# Patient Record
Sex: FEMALE | Race: OTHER | Employment: OTHER | URBAN - METROPOLITAN AREA
[De-identification: names, ages, dates, MRNs, and addresses within clinical notes are randomized per-mention and may not be internally consistent; named-entity substitution may affect disease eponyms.]

---

## 2017-06-12 NOTE — PATIENT DISCUSSION
retinal hemorrhage, non diabetic. disc at length with pt findings, scheduled consult with  as directed during today's visit.

## 2017-06-12 NOTE — PATIENT DISCUSSION
DISCUSSED REASON FOR PAIN/TIANNA COULD BE FROM C-PAP AND DRYING EYES OUT.  CAN GET SOME ARTIFICIAL LIQUID TEARS OR GEL TO USE QHS

## 2017-07-14 NOTE — PATIENT DISCUSSION
LETTER TO DR. Maya Means: Marika Cordoba TO BE OBTAINED AT DR. Billy New LOCATION OF CHOICE -- BP CHECK, CBC with MANUAL DIFF, FASTING BG, Hgb A1C, CAROTID DUPLEX STUDY.

## 2017-07-14 NOTE — PATIENT DISCUSSION
RETINA IS ATTACHED: MID-PERIPHERAL RETINAL HEMORRHAGES. DIFFERENTIAL DX INCLUDES DIABETIC RETINOPATHY VS. HYPERTENSIVE RETINOPATHY VS. OCULAR ISCHEMIC SYNDROME (CAROTID DISEASE). NO NEW HOLES OR TEARS SEEN ON DILATED EXAM TODAY.  RETINAL DETACHMENT SIGNS AND SYMPTOMS REVIEWED

## 2017-10-20 NOTE — PATIENT DISCUSSION
MID-PERIPHERAL RETINAL HEMORRHAGES OS: DM AND HTN SCREENING WERE NORMAL. AWAITING CAROTID DUPLEX REPORT TO RULE OUT CAROTID STENOSIS. CBC SHOWED LOW PLATELETS: RECOMMEND FURTHER WORK-UP FOR THROMBOCYTOPENIA. WILL REQUEST HEMATOLOGY CONSULT THROUGH DR Yvon Felix.

## 2018-01-26 NOTE — PATIENT DISCUSSION
EPIMACULAR MEMBRANE, OS: PATIENT NOT FUNCTIONALLY BOTHERED. SURGERY NOT RECOMMENDED AT THIS TIME. RETURN AS SCHEDULED FOR OCT / EVALUATION.

## 2018-01-26 NOTE — PATIENT DISCUSSION
STABLE RETINAL HEMORRHAGES:  RETURN TO DR Nafisa Norton. LIFELINE SCREENING SHOWED NO SIGNIFICANT FLOW ABNORMALITIES; HOWEVER I RECOMMEND SCHEDULING DEDICATED CAROTID DUPLEX WITH DR Nafisa Norton TO RULE OUT STENOSIS. RECOMMEND RETURNING TO DR Nafisa Norton FOR WORK-UP OF THROMBOCYTOPENIA FOUND ON CBC TEST.

## 2018-04-27 NOTE — PATIENT DISCUSSION
RETINA IS ATTACHED OS: PERIPHERAL RETINAL HEMORRHAGES STABLE; NO HOLES OR TEARS SEEN ON DILATED EXAM TODAY.  RETINAL DETACHMENT SIGNS AND SYMPTOMS REVIEWED

## 2018-04-27 NOTE — PATIENT DISCUSSION
CAROTID DOPPLERS SHOWED NO SIGNIFICANT STENOSIS. CBC SHOWED MILD THROMBOCYTOPENIA. RECOMMEND FOLLOW-UP WITH DR Claudia Baca TO ADDRESS RESULTS OF CBC.

## 2018-04-27 NOTE — PATIENT DISCUSSION
A Retinal hemorrhage is a disorder of the eye in which bleeding occurs into the retinal tissue in the back of the eye. A retinal hemorrhage can be caused by hypertension, retinal vein occlusion or diabetes mellitus.  retinal hemorrhages are diagnosed and managed by dilated examination in detail by ophthalmoscopy, fundus photography, or a dilated fundus exam.

## 2018-10-26 NOTE — PATIENT DISCUSSION
COUNSELING: [facility] Hospital Medicine  Progress Note    Patient Name: Genie Balderas  MRN: [id number]  Patient Class: IP- Inpatient   Admission Date: 9/24/2017  Length of Stay: 2 days  Attending Physician: Rebel Cardenas MD  Primary Care Provider: Ryan Sims MD        Subjective:     Principal Problem:Sepsis    HPI:  Genie Balderas is a 55 y.o. female patient who presented to the Emergency Department for complaint of Cough that started 2 days ago. The cough is non productive. Associated symptoms include Sharp Like CP to Left chest radiates to back, that stated today due to coughing, and, fatigue, fever, n/v/d. Symptoms are intermittent and moderate in severity. OTC meds and nebs not helping at home.  No mitigating or exacerbating factors reported.  Patient denies any SOB, leg swelling, palpitations, HA, lightheadedness, dizziness, numbness, weakness, and all other sxs at this time. No further complaints or concerns at this time.        Hospital Course:  Patient was placed on OBS for sepsis d/t bilat lower lobe pneumonia. She was started on IV abx, breathing treatments, and mucinex. Patient improving slowly - moved to Inpatient since will need extended IV abx.     6/26;  Some better;  Has faint posterior wheezing today;Continued cough; upper back pain on left, with the cough  On for repeat CXR today; also incentive spirometry added; also tylenol with codeine for coughtand pain complaint.    6/27:  Looks and feels better;  Still with back pain though less;  Had restful night overnight; working with incentive device once hourly while awake.    Interval History: reports some improvement with less cough; rested better overnight.    Review of Systems   Constitutional: Positive for fatigue.   HENT: Negative.    Eyes: Negative.    Respiratory: Positive for cough and shortness of breath.    Cardiovascular: Positive for chest pain.   Gastrointestinal: Negative.    Endocrine: Negative.    Genitourinary: Negative.     Musculoskeletal: Positive for back pain.   Neurological: Negative.    Hematological: Negative.    Psychiatric/Behavioral: Negative.      Objective:     Vital Signs (Most Recent):  Temp: 99.2 °F (37.3 °C) (09/27/17 0742)  Pulse: 95 (09/27/17 0845)  Resp: (!) 22 (09/27/17 0845)  BP: 132/67 (09/27/17 0742)  SpO2: 98 % (09/27/17 0845) Vital Signs (24h Range):  Temp:  [97.4 °F (36.3 °C)-100.3 °F (37.9 °C)] 99.2 °F (37.3 °C)  Pulse:  [] 95  Resp:  [16-22] 22  SpO2:  [94 %-98 %] 98 %  BP: (120-143)/(59-75) 132/67     Weight: 72.9 kg (160 lb 11.2 oz)  Body mass index is 27.58 kg/m².    Intake/Output Summary (Last 24 hours) at 09/27/17 1057  Last data filed at 09/27/17 0400   Gross per 24 hour   Intake              220 ml   Output             1700 ml   Net            -1480 ml      Physical Exam   Constitutional: She is oriented to person, place, and time. She appears well-developed and well-nourished.   HENT:   Head: Normocephalic and atraumatic.   Eyes: EOM are normal. Pupils are equal, round, and reactive to light.   Neck: Normal range of motion. Neck supple.   Cardiovascular: Normal rate and regular rhythm.    Pulmonary/Chest: Effort normal.   Diminished at bases;    Genitourinary:   Genitourinary Comments: deferred   Musculoskeletal: Normal range of motion.   Left flank pain to palpation.   Neurological: She is alert and oriented to person, place, and time.   Skin: Skin is warm and dry.       Significant Labs:   Blood Culture: No results for input(s): LABBLOO in the last 48 hours.  BMP:   Recent Labs  Lab 09/27/17  0415         K 3.1*      CO2 32*   BUN 3*   CREATININE 0.8   CALCIUM 8.7   MG 2.0  2.1     CBC:   Recent Labs  Lab 09/26/17  0540 09/27/17  0415   WBC 14.03* 11.08   HGB 9.5* 9.7*   HCT 29.6* 30.6*   * 433*     Respiratory Culture: No results for input(s): GSRESP, RESPIRATORYC in the last 48 hours.    Significant Imaging:   Imaging Results          X-Ray Chest 1 View (Final  result)  Result time 09/26/17 10:45:23   Procedure changed from X-Ray Chest PA And Lateral     Final result by JEFFREY Britt Sr., MD (09/26/17 10:45:23)                 Impression:      There has been interval development of patchy areas of increased density scattered throughout the lungs. This is consistent with the patient's history and characteristic of pneumonia.      Electronically signed by: JEFFREY BRITT MD  Date:     09/26/17  Time:    10:45              Narrative:    One-view chest x-ray    Clinical History: Pneumonia    Finding: Comparison was made to a prior examination performed on 9/24/2017. The size of the heart is normal. There has been interval development of patchy areas of increased density scattered throughout the lungs. There is no pneumothorax.  The costophrenic angles are sharp.                             CT Abdomen Pelvis  Without Contrast (Final result)  Result time 09/25/17 07:34:22    Final result by Deuce Sherman MD (09/25/17 07:34:22)                 Impression:       Bilateral parenchymal infiltrates are seen within the lower lobes.    Mild prominence of the right renal collecting system however no obstructing stone is seen. This can be seen in a setting of recently passed stone.     Left inguinal adenopathy measuring up to 1.4 cm.    Subcentimeter hypodensity is seen within the mid body of the pancreas which is indeterminate. Consider interval followup    All CT scans at this facility use dose modulation, iterative reconstruction and/or weight based dosing when appropriate to reduce radiation dose to as low as reasonably achievable.      Electronically signed by: DEUCE SHERMAN MD  Date:     09/25/17  Time:    07:34              Narrative:    Indication: Abdominal pain.    Routine noncontrast CT images of the abdomen and pelvis were obtained.    Findings:    Bilateral parenchymal infiltrates are seen within the lower lobes.  Heart size is normal.  No pericardial or pleural effusion.       The liver is normal in size and attenuation on this noncontrast exam.  Status post cholecystectomy The  stomach, spleen,  adrenal glands are normal.  Subcentimeter hypodensity is seen within the mid body of the pancreas which is indeterminate. Consider interval followup. Aorta demonstrates moderate atherosclerotic disease.      The kidneys are normal in size shape and position without radiopaque calculus or signs of hydronephrosis. Mild prominence of the right renal collecting system however no obstructing stone is seen. This can be seen in a setting of recently passed stone. The bladder is incompletely distended.     The visualized loops of small bowel are unremarkable.The appendix is visualized in the right lower quadrant.  There is no inflammation. Colon demonstrates diverticulosis without evidence of diverticulitis. Mild constipation    Left inguinal adenopathy..      Bones appear intact with moderate degenerative changes                             CTA Chest Non-Coronary (PE Study) (Final result)     Abnormal  Result time 09/25/17 07:39:18    Final result by Deuce Sherman MD (09/25/17 07:39:18)                 Impression:         Bilateral pulmonary infiltrates are seen some with a micronodular pattern which can be seen in infectious (typical or atypical) and or inflammatory process. Followup to resolution is recommended.    Mediastinal and bilateral hilar adenopathy which can be seen in the setting of infectious/reactive process versus neoplastic versus lymphoproliferative disorder.    No evidence of PE.    Fatty liver.    All CT scans at this facility use dose modulation, iterative reconstruction, and/or weight base dosing when appropriate to reduce radiation dose to as low as reasonably achievable.      Electronically signed by: DEUCE SHERMAN MD  Date:     09/25/17  Time:    07:39              Narrative:    Clinical data: Chest pain.    Axial CT images through the chest after administration of contrast  was performed according to PE study protocol using 100 cc Omni 350.    Findings:    The pulmonary arterial system is well opacified with no evidence of filling defect to suggest pulmonary embolus or thrombus.    Structures of the base of the neck are normal.    The heart and pericardium are unremarkable.  Trace pericardial effusion.  Mediastinal structures including great vessels, trachea, esophagus are intact.    Mediastinal and bilateral hilar adenopathy which may be reactive.      Bilateral pulmonary infiltrates are seen some with a micronodular pattern which can be seen in infectious or inflammatory process. Mild emphysematous changes are seen within the lungs bilaterally.    Surrounding chest wall structures, visualized abdominal organs, bones are unremarkable. Cholecystectomy and mild fatty infiltration of liver suspected.                             X-Ray Chest AP Portable (Final result)  Result time 09/24/17 22:22:56    Final result by Celia Billings MD (09/24/17 22:22:56)                 Impression:         Negative portable chest x-ray.      Electronically signed by: CELIA BILLINGS MD  Date:     09/24/17  Time:    22:22              Narrative:    Portable chest x-ray. 09/24/17 22:13:49    Clinical indication:  sepsis    Heart size is normal. The lung fields are clear. No acute cardiopulmonary infiltrate.                             RADIOLOGY REPORT (Final result)  Result time 09/26/17 15:17:37              Assessment/Plan:      * Sepsis due to bilateral lower lobe pneumonia     --WBC 17K, Pulse 136, RR 22, evelin source: bilat pneumonia  --PO Avelox and mucinex  --sputum culture pending  -Blood Cultures pending  --duonebs   --supplemental oxygen to maintain sats    6/26:  Continue present antibiotics with monitoring of the CXR  On IV rocephin and Doxycycline at present.  Monitor cultures (blood and sputum)  Continue duonebs; pulmonary incentives.    6/27: patchy diffuse disease both lungs;  Will continue  present regimen;  monitor CXR response.  Culture of blood negative.  WBC on the decline.           Anemia    9/27: question dilutional component;  Will check b-12, folate, iron stores and monitor.          Leukocytosis    9/27:  Improved with antibiotics;  Will continue to monitor          Hypomagnesemia    Magnesium replacement ordered;  Will monitor response.  Vitamin D level ordered with next lab draw.      9/27:  Severe vitamin D deficiency; will replete vitamin D.  magensium.        Hypokalemia    Continue to monitor.  K-phosphate ordered today;  Will check a magnesium level in the Am as well.    9/27:  Likely related to beta-agonist therapy;  Will supplement and monitor          Tobacco abuse    --counseled on smoking cessation  --refused nicotine patch    6/26: continue to stress smoking cessation.        HTN (hypertension)    Hold BP meds at this time, due to presentation with hypotension  monitor and start once improving       6/26: BP trending upward;  Resumed Toprol-XL today;  Will monitor.response to therapy    9/27: improved.; continue present regimen        Hypophosphatemia    --monitor and replete as indicated      6/26;  PO4 2.2 with K of 3.4;  Will replete with potassium phosphate and monitor response to therapy.    6/27:  3.5 on todays labs.        Chest pain    --likely secondary to pneumonia  --CT showed No central pulmonary embolus, Bilateral pulmonary infiltrates and nodules   -EKG reviewed  --Serial Troponins negative  9/26: Continue present regimen; with monitoring of the cXR  Continued antibiotics (now on Rocephin and Doxy)  Pulmonary incentives started; along with continued nebs    9/27: pain of musculoskeletal origin; tylenol with codeine ordered;           VTE Risk Mitigation         Ordered     rivaroxaban tablet 20 mg  With dinner     Route:  Oral        09/25/17 0240     Medium Risk of VTE  Once      09/25/17 0208     Place sequential compression device  Until discontinued       09/25/17 0208              Rebel Montes MD  Department of Hospital Medicine   [facility]

## 2018-10-26 NOTE — PATIENT DISCUSSION
EPIMACULAR MEMBRANE, OS:  VISUALLY SIGNIFICANT. DISCUSSED SURGICAL OPTIONS - PATIENT NOT INTERESTED IN SURGERY AT THIS TIME. RETURN AS SCHEDULED FOR OCT / EVALUATION.

## 2019-06-12 NOTE — PATIENT DISCUSSION
POST-OP DAY 1 EXAM: S/P PPV/MP OS. Doing well today. Retina attached. IOP within acceptable limits. Start eyedrops in the surgical eye as instructed: Pred 4x/day, Cipro 4x/day, Atropine 2x/day. Take tylenol or ibuprofen for any mild eye pain or pressure. Retinal detachment and endophthalmitis precautions reviewed. Instructed to call immediately for worsening vision, eye pain, or eye discharge.

## 2019-06-12 NOTE — PATIENT DISCUSSION
Continue: ciprofloxacin (ciprofloxacin): drops: 0.3% 1 drop four times a day as directed into affected eye 05-

## 2019-06-12 NOTE — PATIENT DISCUSSION
Continue: prednisolone acetate (prednisolone acetate): drops,suspension: 1% 1 drop as directed into affected eye 05-

## 2019-06-21 NOTE — PATIENT DISCUSSION
POST-OP WEEK 1 EXAM: S/P PPV/MP OS. Doing well today. Retina attached. IOP within acceptable limits. Continue eyedrops in the surgical eye as instructed. Begin Pred taper. Discontinue Cipro and Atropine. Retinal detachment and endophthalmitis precautions reviewed. Instructed to call immediately for worsening vision, eye pain, or eye discharge.

## 2019-07-12 NOTE — PATIENT DISCUSSION
POST-OP MONTH 1 EXAM S/P PPV/MP OS : Doing well today. Retina attached. IOP within acceptable limits. Finish eyedrops in the surgical eye as instructed. Retinal detachment and endophthalmitis precautions reviewed. Instructed to call immediately for worsening vision, eye pain, or eye discharge.

## 2019-09-20 NOTE — PATIENT DISCUSSION
RETINA IS ATTACHED OS: S/P PPV/MP OS;  NO HOLES OR TEARS SEEN ON DILATED EXAM TODAY.  RETINAL DETACHMENT SIGNS AND SYMPTOMS REVIEWED

## 2019-09-20 NOTE — PATIENT DISCUSSION
RETINAL HEMORRHAGES, OS: STABLE. RETURN TO DR Deisi Power FOR SCREENING OF RISK FACTORS -- BP CHECK, LIPID PANEL, BLOOD GLUCOSE, CBC.

## 2019-09-21 NOTE — PATIENT DISCUSSION
VITREOUS HEMORRHAGE, OS - CONSISTENT WITH HX OIS / MIDPERIPHERAL IRH: STRESSED IMPORTANCE OF KEEPING HEAD ELEVATED. DISCUSSED WITH DR Kvng Castillo WHO WILL FOLLOW UP WITH PT TOMORROW. CALLBACK INSTRUCTIONS GIVEN.  FALL PRECAUTIONS DISCUSSED

## 2019-09-23 NOTE — PATIENT DISCUSSION
RETINA IS ATTACHED OS: CONFIRMED BY B-SCAN ULTRASOUND TODAY.  RETINAL DETACHMENT SIGNS AND SYMPTOMS REVIEWED

## 2019-09-27 NOTE — PATIENT DISCUSSION
POST-OP DAY 1 EXAM, S/P PPV OS : Doing well today. Retina attached. IOP within acceptable limits. Start eyedrops in the surgical eye as instructed: Pred 4x/day, Cipro 4x/day, Atropine 2x/day. Take tylenol or ibuprofen for any mild eye pain or pressure. Retinal detachment and endophthalmitis precautions reviewed. Instructed to call immediately for worsening vision, eye pain, or eye discharge.

## 2019-10-04 NOTE — PATIENT DISCUSSION
RETINAL HEMORRHAGES, OS:  REPEAT SCREENING FOR DIABETES AND CAROTID ARTERY STENOSIS. RETURN TO DR. Apolinar Bray TO OBTAIN HEMOGLOBIN A1C, AND TO ORDER CT ANGIOGRAPHY OF HEAD/NECK/ORBITS WITH AND WITHOUT CONTRAST.

## 2019-10-04 NOTE — PATIENT DISCUSSION
POST-OP WEEK 1 EXAM: S/P PPV OS: Doing well today. Retina attached. IOP within acceptable limits. Continue eyedrops in the surgical eye as instructed. Begin Pred taper. Discontinue Cipro and Atropine. Retinal detachment and endophthalmitis precautions reviewed. Instructed to call immediately for worsening vision, eye pain, or eye discharge.

## 2019-10-04 NOTE — PATIENT DISCUSSION
RETINAL AND VITREOUS HEMORRHAGES, OS:  AVASTIN (1.25MG) INTRAVITREAL INJECTION. RETURN FOR FOLLOW-UP AS SCHEDULED.

## 2019-10-18 NOTE — PATIENT DISCUSSION
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 PATIENT IS SCHEDULED TO SEE DR Geo Rae NEXT WEEK TO OBTAIN VASCULAR SURGERY REFERRAL.

## 2019-10-18 NOTE — PATIENT DISCUSSION
OCULAR ISCHEMIC SYNDROME AND VITREOUS HEMORRHAGE, OS:  RETURN AS SCHEDULED FOR AVASTIN (1.25MG) INTRAVITREAL INJECTION.

## 2019-10-18 NOTE — PATIENT DISCUSSION
POST-OP WEEK 3 EXAM: S/P PPV OS: Doing well today. Retina attached. IOP within acceptable limits. Continue eyedrops in the surgical eye as instructed. Begin Pred taper. Discontinue Cipro and Atropine. Retinal detachment and endophthalmitis precautions reviewed. Instructed to call immediately for worsening vision, eye pain, or eye discharge.

## 2019-11-01 NOTE — PATIENT DISCUSSION
OCULAR ISCHEMIC SYNDROME AND VITREOUS HEMORRHAGE, OS:  AVASTIN (1.25MG) INTRAVITREAL INJECTION. RETURN FOR FOLLOW UP AS SCHEDULED.

## 2020-02-20 NOTE — PATIENT DISCUSSION
Proliferative Retinopathy Counseling: The patient was informed that ongoing control of blood glucose, blood pressure and lipid levels is necessary to minimize future retinal damage due to diabetes.

## 2020-06-12 NOTE — PATIENT DISCUSSION
RETINA IS ATTACHED: S/P PPV/MEMBRANE PEELING AND PRP LASER OS. NO HOLES OR TEARS SEEN ON DILATED EXAM TODAY.  RETINAL DETACHMENT SIGNS AND SYMPTOMS REVIEWED

## 2020-06-12 NOTE — PATIENT DISCUSSION
OCULAR ISCHEMIC SYNDROME, OS: STABLE AFTER PRP LASER OS.  RETURN FOR FOLLOW-UP AS SCHEDULED FOR DILATED EYE EXAM.

## 2020-10-22 ENCOUNTER — NEW PATIENT COMPREHENSIVE (OUTPATIENT)
Dept: URBAN - METROPOLITAN AREA CLINIC 32 | Facility: CLINIC | Age: 65
End: 2020-10-22

## 2020-10-22 DIAGNOSIS — H25.013: ICD-10-CM

## 2020-10-22 PROCEDURE — 92015 DETERMINE REFRACTIVE STATE: CPT

## 2020-10-22 PROCEDURE — 92004 COMPRE OPH EXAM NEW PT 1/>: CPT

## 2020-10-22 ASSESSMENT — VISUAL ACUITY
OS_CC: J1+
OD_SC: 20/30-1
OD_SC: J5-2
OU_SC: 20/30-1+2
OS_SC: 20/30+2
OS_SC: J16
OD_CC: J1+

## 2020-10-22 ASSESSMENT — KERATOMETRY
OS_AXISANGLE_DEGREES: 151
OD_AXISANGLE2_DEGREES: 128
OS_K2POWER_DIOPTERS: 39.75
OS_K1POWER_DIOPTERS: 40.5
OD_K1POWER_DIOPTERS: 40.25
OS_AXISANGLE2_DEGREES: 61
OD_AXISANGLE_DEGREES: 38
OD_K2POWER_DIOPTERS: 39.5

## 2020-10-22 ASSESSMENT — TONOMETRY
OS_IOP_MMHG: 11
OD_IOP_MMHG: 14

## 2021-04-16 NOTE — PATIENT DISCUSSION
OCULAR ISCHEMIC SYNDROME, OS: STABLE. S/P PRP LASER OS.  RETURN FOR FOLLOW-UP AS SCHEDULED FOR DILATED EYE EXAM.

## 2021-12-20 ENCOUNTER — COMPREHENSIVE EXAM (OUTPATIENT)
Dept: URBAN - METROPOLITAN AREA CLINIC 32 | Facility: CLINIC | Age: 66
End: 2021-12-20

## 2021-12-20 DIAGNOSIS — H25.013: ICD-10-CM

## 2021-12-20 PROCEDURE — 92014 COMPRE OPH EXAM EST PT 1/>: CPT

## 2021-12-20 PROCEDURE — 92015 DETERMINE REFRACTIVE STATE: CPT

## 2021-12-20 ASSESSMENT — VISUAL ACUITY
OD_SC: J3
OD_SC: 20/30-1
OD_CC: J1+
OS_SC: J10
OS_SC: 20/25
OS_CC: J1+

## 2021-12-20 ASSESSMENT — KERATOMETRY
OD_AXISANGLE_DEGREES: 38
OS_K1POWER_DIOPTERS: 40.5
OD_K2POWER_DIOPTERS: 39.5
OS_AXISANGLE2_DEGREES: 61
OD_K1POWER_DIOPTERS: 40.25
OS_K2POWER_DIOPTERS: 39.75
OD_AXISANGLE2_DEGREES: 128
OS_AXISANGLE_DEGREES: 151

## 2021-12-20 ASSESSMENT — TONOMETRY
OD_IOP_MMHG: 16
OS_IOP_MMHG: 12

## 2023-01-17 ENCOUNTER — COMPREHENSIVE EXAM (OUTPATIENT)
Dept: URBAN - METROPOLITAN AREA CLINIC 32 | Facility: CLINIC | Age: 68
End: 2023-01-17

## 2023-01-17 DIAGNOSIS — H25.013: ICD-10-CM

## 2023-01-17 PROCEDURE — 92014 COMPRE OPH EXAM EST PT 1/>: CPT

## 2023-01-17 PROCEDURE — 92015 DETERMINE REFRACTIVE STATE: CPT

## 2023-01-17 ASSESSMENT — KERATOMETRY
OS_AXISANGLE_DEGREES: 159
OS_K2POWER_DIOPTERS: 39.50
OD_AXISANGLE_DEGREES: 37
OD_K1POWER_DIOPTERS: 40.00
OS_AXISANGLE2_DEGREES: 69
OS_K1POWER_DIOPTERS: 40.75
OD_AXISANGLE2_DEGREES: 127
OD_K2POWER_DIOPTERS: 39.00

## 2023-01-17 ASSESSMENT — VISUAL ACUITY
OS_CC: J1+
OS_SC: 20/30+1
OD_CC: J1+
OD_SC: 20/30+1
OD_SC: J5
OS_SC: J7

## 2023-01-17 ASSESSMENT — TONOMETRY
OS_IOP_MMHG: 14
OD_IOP_MMHG: 15

## 2024-04-02 NOTE — PATIENT DISCUSSION
Posterior Capsular Fibrosis Counseling: The diagnosis of posterior capsular fibrosis (PCF), also referred to as a secondary cataract or posterior capsular opacification (PCO), was discussed with the patient. The patient understands that their symptoms and limitations are likely related to this condition. I have reviewed the risks, benefits and alternatives of  YAG laser surgery for the treatment of the fibrosis. The uncommon risk of an increase in intraocular pressure or a retinal detachment and their associated symptoms were explained to the patient. The patient understands and desires to proceed with the laser surgery to improve their vision. Speaking Coherently